# Patient Record
Sex: FEMALE | Race: WHITE | NOT HISPANIC OR LATINO | Employment: UNEMPLOYED | URBAN - METROPOLITAN AREA
[De-identification: names, ages, dates, MRNs, and addresses within clinical notes are randomized per-mention and may not be internally consistent; named-entity substitution may affect disease eponyms.]

---
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
## 2025-05-06 ENCOUNTER — TELEPHONE (OUTPATIENT)
Dept: FAMILY MEDICINE CLINIC | Facility: CLINIC | Age: 20
End: 2025-05-06

## 2025-05-06 NOTE — TELEPHONE ENCOUNTER
Caller: Amberly Prieto    Relationship: Self    Best call back number: [phone]    Who is your current provider: KAMARI KWONG, DO    Is your current provider offboarding? NO    Who would you like your new provider to be: FEMALE     What are your reasons for transferring care: MORE COMFORTABLE WITH A FEMALE